# Patient Record
(demographics unavailable — no encounter records)

---

## 2025-03-24 NOTE — ASSESSMENT
[FreeTextEntry1] : # Acne, moderate inflammatory, face - chronic, improved but not at treatment goal  Overall baseline acne signif improved on Accutane (Cumulative dose 230 mg/kg), though with hormonal flares. Winlevi cream and dapsone gel too expensive (copay due to deductible).  Previously failed topicals, spironolactone 100mg, PO doxycycline (prior to accutane) - Diagnosis, chronic nature, disease course, treatment options and goals of therapy discussed - Decrease to tretinoin 0.025% cream at night- start off 2 nights a week and work up to nightly as tolerated, SED including irritation, cannot be used in pregnancy. Use gentle moisturizer. If not covered, buy otc differin adapalene gel - c/w spironolactone 100 mg nightly. Expectation that it will take 3-6 months to see clinical effect and may need to continue to see sustained effect. Side effects discussed including menstrual irregularities, dizziness, increased urination, and breast tenderness. Less common side effects include edema and hyperkalemia, more likely in those with risk factors, amongst others. Eat a well-balanced diet and avoid excessive intake of high-potassium containing foods or excessive exercise without proper hydration. Women on spironolactone should be on contraceptives/take adequate measures to eliminate risk of conceiving while on this medication as it could cause birth defects. Patient currently not pregnant, denies personal history of renal or blood pressure issues. Patient aware to stop medication if becomes pregnant. Patient expressed understanding.  # Hair loss, favor TE - discussed in person appt for thorough eval, but history fits with TE. Discussed self resolving once stressor/trigger resolves. Can check Vit D, iron/ferritin, and TSH with pcp if continues.  - can c/w rogaine otc  RTC 12 months  This visit was conducted via live synchronous audio/video telehealth with the patient and provider. The patient attested to being located in [facility] where the provider is also currently located and licensed to practice medicine. Verbal consent was obtained for this telemedicine encounter. Risks and benefits of using Telehealth services has been discussed with the patient. The patient has been given ample opportunity to discuss any questions regarding [facility]'s Telehealth services. All of the patient's questions were answered to his/her satisfaction.  Verbal consent was obtained for this telehealth visit. The patient's date of birth and location have been confirmed.

## 2025-03-24 NOTE — PHYSICAL EXAM
[FreeTextEntry3] : General: well appearing person in nad, alert, pleasant Focused Skin Exam per patient preference:  scattered on forehead with atrophic scars, few comedones

## 2025-03-24 NOTE — HISTORY OF PRESENT ILLNESS
[FreeTextEntry1] : f/u acne [de-identified] : SYLVAIN WYLIE is a 27 year old F last seen 1 year ago, here for f/u acne. On robin 100mg nightly, tolerates well, on an OCP; denies side effects. Was on tret 0.05 but made her too dry so not using.  Also notes increased hair shedding like when she was on accutane. Is already supplementing vit D, using rogaine (on for long time now), has stressors.  Hx: - Worse with prozac, so stopped prozac recently Modifying factors/Treatments: failed spironolactone 100mg daily (6 months). Prev used PO doxycycline with partial improvement, takes on/off.  - 12/22/22: Tried spironolactone 100mg BID for >1 month, made acne worse. Started doxycycline 100mg once daily on/off. On Ortho-Tricyclen for birth control. Would like to start doxycycline - 1/24/23: here to start Accutane. Off other acne medications - 2/21/23: tolerating 30mg once daily; +dry lips however. Acne breaking out signif on chin and forehead - 3/24/23: tolerating 60mg daily, except itchy rash on hands, dry lips, and increased anxiety. Denies SI. Using lanolin on lips.  Pt prefers to stay on 60mg dosing. Still flaring on chin and forehead, but less severe than prior - 4/28/23: tolerating 60mg daily. Except she notes she had 2 stye infections in R eye in past month, tx with abx drops. Also started new ADHD medication Qelbree 4 weeks ago - 5/23/23: tolerating 60mg daily; no side effects. Denies N/V/abdominal pain or other side effects. First month without new acne breakouts, pt is happy - 6/22/23: switched brand of accutane by pharmacy, notes breakouts on forehead and back. +Dry lips.  - 8/1/23: breakout on forehead, on july 13th. Period 1 week later. Otherwise doing well.  - 10/24/23: Pt just using tretinoin 0.05%. Winlevi was approved, but pt not able to afford due to high deductible. Same issue for dapsone gel.  Pt interested in starting PO spironolactone. Notes flares 1 week prior to periods. Pt on orthotricyclen for >10 years - 2/27/24: stable with tretinoin 0.05% and spironolactone 100mg qhs  No hx of IBD. +Hx of depression, follows with psychiatrist Dr. Steffany Anthony Personal hx of skin cancer: no FHx of skin cancer: maternal GM with skin cancer Social hx: [place] firm; Not sexually active. Of note, parents are moving to [place] so patient may not have a place to stay [place], she will consider telehealth with me (or switch to [place] provider)